# Patient Record
Sex: FEMALE | Race: WHITE | NOT HISPANIC OR LATINO | Employment: UNEMPLOYED | URBAN - METROPOLITAN AREA
[De-identification: names, ages, dates, MRNs, and addresses within clinical notes are randomized per-mention and may not be internally consistent; named-entity substitution may affect disease eponyms.]

---

## 2018-04-08 ENCOUNTER — HOSPITAL ENCOUNTER (OUTPATIENT)
Facility: HOSPITAL | Age: 83
Discharge: HOME | End: 2018-04-09
Attending: EMERGENCY MEDICINE | Admitting: PSYCHIATRY & NEUROLOGY
Payer: MEDICARE

## 2018-04-08 DIAGNOSIS — R20.0 LEFT FACIAL NUMBNESS: ICD-10-CM

## 2018-04-08 DIAGNOSIS — G45.9 TRANSIENT CEREBRAL ISCHEMIA, UNSPECIFIED TYPE: Primary | ICD-10-CM

## 2018-04-08 PROBLEM — R53.1 LEFT-SIDED WEAKNESS: Status: ACTIVE | Noted: 2018-04-08

## 2018-04-08 PROBLEM — E78.2 MIXED HYPERLIPIDEMIA: Status: ACTIVE | Noted: 2018-04-08

## 2018-04-08 PROBLEM — I61.5 IVH (INTRAVENTRICULAR HEMORRHAGE): Status: ACTIVE | Noted: 2018-04-08

## 2018-04-08 PROBLEM — E03.9 HYPOTHYROIDISM: Status: ACTIVE | Noted: 2018-04-08

## 2018-04-08 PROBLEM — R29.90 EPISODE OF TRANSIENT NEUROLOGIC SYMPTOMS: Status: ACTIVE | Noted: 2018-04-08

## 2018-04-08 PROBLEM — I10 ESSENTIAL HYPERTENSION: Status: ACTIVE | Noted: 2018-04-08

## 2018-04-08 LAB
ABO + RH BLD: NORMAL
ALBUMIN SERPL BCP-MCNC: 4.1 G/DL
ALP SERPL-CCNC: 66 U/L
ALT SERPL W/O P-5'-P-CCNC: 23 U/L
ANION GAP SERPL CALC-SCNC: 12 MMOL/L
AST SERPL-CCNC: 31 U/L
BASOPHILS # BLD AUTO: 0.04 K/UL
BASOPHILS NFR BLD: 0.4 %
BILIRUB SERPL-MCNC: 0.5 MG/DL
BLD GP AB SCN CELLS X3 SERPL QL: NORMAL
BUN SERPL-MCNC: 19 MG/DL
CALCIUM SERPL-MCNC: 9.8 MG/DL
CHLORIDE SERPL-SCNC: 106 MMOL/L
CHOLEST SERPL-MCNC: 181 MG/DL
CHOLEST/HDLC SERPL: 2.9 {RATIO}
CO2 SERPL-SCNC: 22 MMOL/L
CREAT SERPL-MCNC: 0.9 MG/DL
DIFFERENTIAL METHOD: ABNORMAL
EOSINOPHIL # BLD AUTO: 0.1 K/UL
EOSINOPHIL NFR BLD: 1.3 %
ERYTHROCYTE [DISTWIDTH] IN BLOOD BY AUTOMATED COUNT: 12.9 %
EST. GFR  (AFRICAN AMERICAN): >60 ML/MIN/1.73 M^2
EST. GFR  (NON AFRICAN AMERICAN): 59.3 ML/MIN/1.73 M^2
ESTIMATED AVG GLUCOSE: 114 MG/DL
GLUCOSE SERPL-MCNC: 120 MG/DL
HBA1C MFR BLD HPLC: 5.6 %
HCT VFR BLD AUTO: 40 %
HDLC SERPL-MCNC: 63 MG/DL
HDLC SERPL: 34.8 %
HGB BLD-MCNC: 13.8 G/DL
IMM GRANULOCYTES # BLD AUTO: 0.03 K/UL
IMM GRANULOCYTES NFR BLD AUTO: 0.3 %
INR PPP: 1
LDLC SERPL CALC-MCNC: 94 MG/DL
LYMPHOCYTES # BLD AUTO: 1.5 K/UL
LYMPHOCYTES NFR BLD: 15.5 %
MCH RBC QN AUTO: 30.4 PG
MCHC RBC AUTO-ENTMCNC: 34.5 G/DL
MCV RBC AUTO: 88 FL
MONOCYTES # BLD AUTO: 0.7 K/UL
MONOCYTES NFR BLD: 7.3 %
NEUTROPHILS # BLD AUTO: 7.1 K/UL
NEUTROPHILS NFR BLD: 75.2 %
NONHDLC SERPL-MCNC: 118 MG/DL
NRBC BLD-RTO: 0 /100 WBC
PLATELET # BLD AUTO: 215 K/UL
PMV BLD AUTO: 10.5 FL
POTASSIUM SERPL-SCNC: 4.2 MMOL/L
PROT SERPL-MCNC: 7.2 G/DL
PROTHROMBIN TIME: 10.2 SEC
RBC # BLD AUTO: 4.54 M/UL
SODIUM SERPL-SCNC: 140 MMOL/L
TRIGL SERPL-MCNC: 120 MG/DL
TSH SERPL DL<=0.005 MIU/L-ACNC: 1.55 UIU/ML
WBC # BLD AUTO: 9.48 K/UL

## 2018-04-08 PROCEDURE — 99285 EMERGENCY DEPT VISIT HI MDM: CPT | Mod: 25

## 2018-04-08 PROCEDURE — 99220 PR INITIAL OBSERVATION CARE,LEVL III: CPT | Mod: ,,, | Performed by: PSYCHIATRY & NEUROLOGY

## 2018-04-08 PROCEDURE — A9585 GADOBUTROL INJECTION: HCPCS | Performed by: EMERGENCY MEDICINE

## 2018-04-08 PROCEDURE — G0378 HOSPITAL OBSERVATION PER HR: HCPCS

## 2018-04-08 PROCEDURE — 84443 ASSAY THYROID STIM HORMONE: CPT

## 2018-04-08 PROCEDURE — 85025 COMPLETE CBC W/AUTO DIFF WBC: CPT

## 2018-04-08 PROCEDURE — 25000003 PHARM REV CODE 250: Performed by: PHYSICIAN ASSISTANT

## 2018-04-08 PROCEDURE — A4216 STERILE WATER/SALINE, 10 ML: HCPCS | Performed by: PHYSICIAN ASSISTANT

## 2018-04-08 PROCEDURE — 80053 COMPREHEN METABOLIC PANEL: CPT

## 2018-04-08 PROCEDURE — 85610 PROTHROMBIN TIME: CPT

## 2018-04-08 PROCEDURE — 86901 BLOOD TYPING SEROLOGIC RH(D): CPT

## 2018-04-08 PROCEDURE — 80061 LIPID PANEL: CPT

## 2018-04-08 PROCEDURE — 96372 THER/PROPH/DIAG INJ SC/IM: CPT | Mod: 59

## 2018-04-08 PROCEDURE — 63600175 PHARM REV CODE 636 W HCPCS: Performed by: PHYSICIAN ASSISTANT

## 2018-04-08 PROCEDURE — 83036 HEMOGLOBIN GLYCOSYLATED A1C: CPT

## 2018-04-08 PROCEDURE — 25500020 PHARM REV CODE 255: Performed by: EMERGENCY MEDICINE

## 2018-04-08 PROCEDURE — 99285 EMERGENCY DEPT VISIT HI MDM: CPT | Mod: ,,, | Performed by: EMERGENCY MEDICINE

## 2018-04-08 RX ORDER — AMLODIPINE BESYLATE 5 MG/1
5 TABLET ORAL DAILY
COMMUNITY

## 2018-04-08 RX ORDER — LABETALOL HYDROCHLORIDE 5 MG/ML
10 INJECTION, SOLUTION INTRAVENOUS
Status: DISCONTINUED | OUTPATIENT
Start: 2018-04-08 | End: 2018-04-09 | Stop reason: HOSPADM

## 2018-04-08 RX ORDER — SIMVASTATIN 20 MG/1
20 TABLET, FILM COATED ORAL NIGHTLY
COMMUNITY
End: 2018-04-08

## 2018-04-08 RX ORDER — LEVOTHYROXINE SODIUM 75 UG/1
75 TABLET ORAL DAILY
COMMUNITY

## 2018-04-08 RX ORDER — BRIMONIDINE TARTRATE 2 MG/ML
1 SOLUTION/ DROPS OPHTHALMIC EVERY 8 HOURS
COMMUNITY

## 2018-04-08 RX ORDER — ESTRADIOL 0.1 MG/G
CREAM VAGINAL DAILY
Status: ON HOLD | COMMUNITY
End: 2018-04-09 | Stop reason: HOSPADM

## 2018-04-08 RX ORDER — CLORAZEPATE DIPOTASSIUM 7.5 MG/1
7.5 TABLET ORAL 3 TIMES DAILY
Status: DISCONTINUED | OUTPATIENT
Start: 2018-04-08 | End: 2018-04-09 | Stop reason: HOSPADM

## 2018-04-08 RX ORDER — CALCIUM CARBONATE 600 MG
600 TABLET ORAL ONCE
COMMUNITY

## 2018-04-08 RX ORDER — LEVOTHYROXINE SODIUM 75 UG/1
75 TABLET ORAL DAILY
Status: DISCONTINUED | OUTPATIENT
Start: 2018-04-09 | End: 2018-04-09 | Stop reason: HOSPADM

## 2018-04-08 RX ORDER — CLORAZEPATE DIPOTASSIUM 7.5 MG/1
7.5 TABLET ORAL 3 TIMES DAILY
COMMUNITY

## 2018-04-08 RX ORDER — SUCRALFATE 1 G/1
1 TABLET ORAL 4 TIMES DAILY
COMMUNITY

## 2018-04-08 RX ORDER — SODIUM CHLORIDE 0.9 % (FLUSH) 0.9 %
3 SYRINGE (ML) INJECTION EVERY 8 HOURS
Status: DISCONTINUED | OUTPATIENT
Start: 2018-04-08 | End: 2018-04-09 | Stop reason: HOSPADM

## 2018-04-08 RX ORDER — ASPIRIN 81 MG/1
81 TABLET ORAL DAILY
Status: DISCONTINUED | OUTPATIENT
Start: 2018-04-09 | End: 2018-04-09 | Stop reason: HOSPADM

## 2018-04-08 RX ORDER — OMEPRAZOLE 40 MG/1
40 CAPSULE, DELAYED RELEASE ORAL DAILY
COMMUNITY

## 2018-04-08 RX ORDER — ATORVASTATIN CALCIUM 20 MG/1
40 TABLET, FILM COATED ORAL DAILY
Status: DISCONTINUED | OUTPATIENT
Start: 2018-04-09 | End: 2018-04-09 | Stop reason: HOSPADM

## 2018-04-08 RX ORDER — GADOBUTROL 604.72 MG/ML
10 INJECTION INTRAVENOUS
Status: COMPLETED | OUTPATIENT
Start: 2018-04-08 | End: 2018-04-08

## 2018-04-08 RX ORDER — HEPARIN SODIUM 5000 [USP'U]/ML
5000 INJECTION, SOLUTION INTRAVENOUS; SUBCUTANEOUS EVERY 8 HOURS
Status: DISCONTINUED | OUTPATIENT
Start: 2018-04-08 | End: 2018-04-09 | Stop reason: HOSPADM

## 2018-04-08 RX ADMIN — HEPARIN SODIUM 5000 UNITS: 5000 INJECTION, SOLUTION INTRAVENOUS; SUBCUTANEOUS at 11:04

## 2018-04-08 RX ADMIN — SODIUM CHLORIDE, PRESERVATIVE FREE 3 ML: 5 INJECTION INTRAVENOUS at 10:04

## 2018-04-08 RX ADMIN — GADOBUTROL 10 ML: 604.72 INJECTION INTRAVENOUS at 07:04

## 2018-04-08 NOTE — ED PROVIDER NOTES
"Encounter Date: 4/8/2018    SCRIBE #1 NOTE: I, Elizabeth Houston, am scribing for, and in the presence of, Dr. Spears.       History     Chief Complaint   Patient presents with    Cerebrovascular Accident     transfer from [facility]. On cardene maintaining SBP 130s. All symptoms resolved PTA. No complaints     Time patient was seen by the provider: 3:20 PM      This is a 83 y.o. female with co-morbidities including intestinal cancer, HTN and hypothyroidism who presents to the ED as a transfer from [facility] for further evaluation of and concern for cerebrovascular accident today. Patient states that at approximately 12:15 PM today, the right side of her face started to "feel funny" and it became difficult for her to stand up. She additionally endorses speech difficulty, states that she could think of what she wanted to say, but could not get the words out. Per the patient's relative at bedside, upon arrival at [facility], she had left-sided weakness and continued with speech difficulty. Symptoms lasted approximately 20 minutes, resolved at the current time. Patient denies any past similar episodes. She is not on any blood thinners.       The history is provided by the patient, medical records and a relative.     Review of patient's allergies indicates:   Allergen Reactions    Sulfa (sulfonamide antibiotics)      Past Medical History:   Diagnosis Date    Glaucoma     HTN (hypertension)     Hypothyroidism     Intestinal cancer     small intestine     Past Surgical History:   Procedure Laterality Date    HYSTERECTOMY       No family history on file.  Social History   Substance Use Topics    Smoking status: Not on file    Smokeless tobacco: Not on file    Alcohol use Not on file     Review of Systems   Constitutional: Negative for chills, diaphoresis and fever.   HENT: Negative for congestion.    Eyes: Negative for photophobia and visual disturbance.   Respiratory: Negative for cough and shortness of breath.  " "  Cardiovascular: Negative for chest pain and palpitations.   Gastrointestinal: Negative for abdominal pain, diarrhea, nausea and vomiting.   Musculoskeletal: Negative for arthralgias and myalgias.   Skin: Negative for rash.   Neurological: Positive for speech difficulty and weakness.        Positive for face "feeling funny."    Psychiatric/Behavioral: Negative for behavioral problems.       Physical Exam     Initial Vitals [04/08/18 1457]   BP Pulse Resp Temp SpO2   134/64 102 16 -- 97 %      MAP       87.33         Physical Exam    Nursing note and vitals reviewed.  Constitutional: She appears well-developed and well-nourished. She is not diaphoretic. No distress.   HENT:   Head: Normocephalic and atraumatic.   Mouth/Throat: Oropharynx is clear and moist.   Neck: Normal range of motion. Neck supple. No JVD present.   Cardiovascular: Normal rate, regular rhythm, normal heart sounds and intact distal pulses.   Pulmonary/Chest: Breath sounds normal. No respiratory distress. She has no wheezes. She has no rhonchi. She has no rales.   Abdominal: Soft. She exhibits no distension. There is no tenderness.   Musculoskeletal: Normal range of motion. She exhibits no edema.   Lymphadenopathy:     She has no cervical adenopathy.   Neurological: She is alert and oriented to person, place, and time. She has normal strength. No cranial nerve deficit or sensory deficit.   Skin: Skin is warm and dry.         ED Course   Procedures  Labs Reviewed   COMPREHENSIVE METABOLIC PANEL - Abnormal; Notable for the following:        Result Value    CO2 22 (*)     Glucose 120 (*)     eGFR if non  59.3 (*)     All other components within normal limits   CBC W/ AUTO DIFFERENTIAL - Abnormal; Notable for the following:     Gran% 75.2 (*)     Lymph% 15.5 (*)     All other components within normal limits   PROTIME-INR   TYPE & SCREEN             Medical Decision Making:   History:   Old Medical Records: I decided to obtain old " medical records.  Clinical Tests:   Lab Tests: Ordered and Reviewed  Radiological Study: Ordered and Reviewed  ED Management:  3:19 PM Before I evaluated the patient, case discussed with Vascular Neurology who is recommending Neuro Critical Care and possible Neurosurgery consult.     3:41 PM Case discussed with Neuro ICU. They will evaluate the patient.     4:17 PM Repeat CT head shows colloid cyst. Does not shows intracranial hemorrhage. I do not think this was the cause of patient's symptoms. She likely had a TIA. Neurosurgical consult and Neuro Critical Care consult cancelled after discussion with Vascular Neurology. They will admit for further treatment and evaluation.   Other:   I have discussed this case with another health care provider.       <> Summary of the Discussion: Vascular (Stroke) Neurology            Scribe Attestation:   Scribe #1: I performed the above scribed service and the documentation accurately describes the services I performed. I attest to the accuracy of the note.    Attending Attestation:           Physician Attestation for Scribe:      Comments: I, Dr. Dannielle Spears, personally performed the services described in this documentation. All medical record entries made by the scribe were at my direction and in my presence.  I have reviewed the chart and agree that the record reflects my personal performance and is accurate and complete. Dannielle Spears MD.                 Clinical Impression:   The primary encounter diagnosis was Transient cerebral ischemia, unspecified type. A diagnosis of Left facial numbness was also pertinent to this visit.    Disposition:   Disposition: Admitted                        Dannielle Spears MD  04/08/18 1926

## 2018-04-08 NOTE — ED NOTES
"Pt states "I was having trouble speaking early this afternoon." Pt's son reports slurred speech and left sided weakness this afternoon. He reports all symptoms resolved during CT scan at [facility].   "

## 2018-04-09 ENCOUNTER — TELEPHONE (OUTPATIENT)
Dept: NEUROLOGY | Facility: CLINIC | Age: 83
End: 2018-04-09

## 2018-04-09 VITALS
BODY MASS INDEX: 27.2 KG/M2 | HEART RATE: 101 BPM | HEIGHT: 59 IN | SYSTOLIC BLOOD PRESSURE: 145 MMHG | TEMPERATURE: 99 F | WEIGHT: 134.94 LBS | OXYGEN SATURATION: 92 % | DIASTOLIC BLOOD PRESSURE: 68 MMHG | RESPIRATION RATE: 17 BRPM

## 2018-04-09 PROBLEM — N30.00 ACUTE CYSTITIS: Status: ACTIVE | Noted: 2018-04-09

## 2018-04-09 LAB
ALBUMIN SERPL BCP-MCNC: 3.9 G/DL
ALP SERPL-CCNC: 59 U/L
ALT SERPL W/O P-5'-P-CCNC: 23 U/L
ANION GAP SERPL CALC-SCNC: 12 MMOL/L
APTT BLDCRRT: 22.5 SEC
AST SERPL-CCNC: 31 U/L
BACTERIA #/AREA URNS AUTO: ABNORMAL /HPF
BASOPHILS # BLD AUTO: 0.05 K/UL
BASOPHILS NFR BLD: 0.6 %
BILIRUB SERPL-MCNC: 0.6 MG/DL
BILIRUB UR QL STRIP: NEGATIVE
BUN SERPL-MCNC: 15 MG/DL
CALCIUM SERPL-MCNC: 9.5 MG/DL
CHLORIDE SERPL-SCNC: 107 MMOL/L
CK MB SERPL-MCNC: 2.2 NG/ML
CK MB SERPL-RTO: 1.1 %
CK SERPL-CCNC: 208 U/L
CLARITY UR REFRACT.AUTO: ABNORMAL
CO2 SERPL-SCNC: 23 MMOL/L
COLOR UR AUTO: ABNORMAL
CREAT SERPL-MCNC: 0.8 MG/DL
DIASTOLIC DYSFUNCTION: NO
DIFFERENTIAL METHOD: NORMAL
EOSINOPHIL # BLD AUTO: 0.2 K/UL
EOSINOPHIL NFR BLD: 2.8 %
ERYTHROCYTE [DISTWIDTH] IN BLOOD BY AUTOMATED COUNT: 13.1 %
EST. GFR  (AFRICAN AMERICAN): >60 ML/MIN/1.73 M^2
EST. GFR  (NON AFRICAN AMERICAN): >60 ML/MIN/1.73 M^2
ESTIMATED PA SYSTOLIC PRESSURE: 13.37
GLUCOSE SERPL-MCNC: 110 MG/DL
GLUCOSE UR QL STRIP: NEGATIVE
HCT VFR BLD AUTO: 41.6 %
HGB BLD-MCNC: 13.5 G/DL
HGB UR QL STRIP: ABNORMAL
IMM GRANULOCYTES # BLD AUTO: 0.02 K/UL
IMM GRANULOCYTES NFR BLD AUTO: 0.2 %
INR PPP: 0.9
KETONES UR QL STRIP: NEGATIVE
LEUKOCYTE ESTERASE UR QL STRIP: ABNORMAL
LYMPHOCYTES # BLD AUTO: 1.6 K/UL
LYMPHOCYTES NFR BLD: 18.5 %
MAGNESIUM SERPL-MCNC: 2.2 MG/DL
MCH RBC QN AUTO: 29.6 PG
MCHC RBC AUTO-ENTMCNC: 32.5 G/DL
MCV RBC AUTO: 91 FL
MICROSCOPIC COMMENT: ABNORMAL
MONOCYTES # BLD AUTO: 0.8 K/UL
MONOCYTES NFR BLD: 9.2 %
NEUTROPHILS # BLD AUTO: 5.9 K/UL
NEUTROPHILS NFR BLD: 68.7 %
NITRITE UR QL STRIP: NEGATIVE
NRBC BLD-RTO: 0 /100 WBC
PH UR STRIP: 7 [PH] (ref 5–8)
PHOSPHATE SERPL-MCNC: 3.6 MG/DL
PLATELET # BLD AUTO: 229 K/UL
PMV BLD AUTO: 10.2 FL
POTASSIUM SERPL-SCNC: 3.9 MMOL/L
PROT SERPL-MCNC: 6.8 G/DL
PROT UR QL STRIP: NEGATIVE
PROTHROMBIN TIME: 9.9 SEC
RBC # BLD AUTO: 4.56 M/UL
RBC #/AREA URNS AUTO: 2 /HPF (ref 0–4)
RETIRED EF AND QEF - SEE NOTES: 60 (ref 55–65)
SODIUM SERPL-SCNC: 142 MMOL/L
SP GR UR STRIP: 1.01 (ref 1–1.03)
SQUAMOUS #/AREA URNS AUTO: 3 /HPF
TRICUSPID VALVE REGURGITATION: NORMAL
TROPONIN I SERPL DL<=0.01 NG/ML-MCNC: <0.006 NG/ML
URN SPEC COLLECT METH UR: ABNORMAL
UROBILINOGEN UR STRIP-ACNC: NEGATIVE EU/DL
WBC # BLD AUTO: 8.52 K/UL
WBC #/AREA URNS AUTO: >100 /HPF (ref 0–5)
WBC CLUMPS UR QL AUTO: ABNORMAL

## 2018-04-09 PROCEDURE — 82550 ASSAY OF CK (CPK): CPT

## 2018-04-09 PROCEDURE — G8997 SWALLOW GOAL STATUS: HCPCS | Mod: CH

## 2018-04-09 PROCEDURE — 25000003 PHARM REV CODE 250: Performed by: PHYSICIAN ASSISTANT

## 2018-04-09 PROCEDURE — 85025 COMPLETE CBC W/AUTO DIFF WBC: CPT

## 2018-04-09 PROCEDURE — G8988 SELF CARE GOAL STATUS: HCPCS | Mod: CH

## 2018-04-09 PROCEDURE — 85730 THROMBOPLASTIN TIME PARTIAL: CPT

## 2018-04-09 PROCEDURE — A4216 STERILE WATER/SALINE, 10 ML: HCPCS | Performed by: PHYSICIAN ASSISTANT

## 2018-04-09 PROCEDURE — 84484 ASSAY OF TROPONIN QUANT: CPT

## 2018-04-09 PROCEDURE — 84100 ASSAY OF PHOSPHORUS: CPT

## 2018-04-09 PROCEDURE — 87086 URINE CULTURE/COLONY COUNT: CPT

## 2018-04-09 PROCEDURE — 97161 PT EVAL LOW COMPLEX 20 MIN: CPT

## 2018-04-09 PROCEDURE — 87088 URINE BACTERIA CULTURE: CPT

## 2018-04-09 PROCEDURE — G0378 HOSPITAL OBSERVATION PER HR: HCPCS

## 2018-04-09 PROCEDURE — 93306 TTE W/DOPPLER COMPLETE: CPT

## 2018-04-09 PROCEDURE — G8996 SWALLOW CURRENT STATUS: HCPCS | Mod: CH

## 2018-04-09 PROCEDURE — 92523 SPEECH SOUND LANG COMPREHEN: CPT

## 2018-04-09 PROCEDURE — 81001 URINALYSIS AUTO W/SCOPE: CPT

## 2018-04-09 PROCEDURE — 87186 SC STD MICRODIL/AGAR DIL: CPT

## 2018-04-09 PROCEDURE — G8979 MOBILITY GOAL STATUS: HCPCS | Mod: CH

## 2018-04-09 PROCEDURE — 82553 CREATINE MB FRACTION: CPT

## 2018-04-09 PROCEDURE — 83735 ASSAY OF MAGNESIUM: CPT

## 2018-04-09 PROCEDURE — 87077 CULTURE AEROBIC IDENTIFY: CPT

## 2018-04-09 PROCEDURE — G8980 MOBILITY D/C STATUS: HCPCS | Mod: CH

## 2018-04-09 PROCEDURE — 97165 OT EVAL LOW COMPLEX 30 MIN: CPT

## 2018-04-09 PROCEDURE — 80053 COMPREHEN METABOLIC PANEL: CPT

## 2018-04-09 PROCEDURE — G8987 SELF CARE CURRENT STATUS: HCPCS | Mod: CH

## 2018-04-09 PROCEDURE — 93306 TTE W/DOPPLER COMPLETE: CPT | Mod: 26,,, | Performed by: INTERNAL MEDICINE

## 2018-04-09 PROCEDURE — 99217 PR OBSERVATION CARE DISCHARGE: CPT | Mod: ,,, | Performed by: PSYCHIATRY & NEUROLOGY

## 2018-04-09 PROCEDURE — 92610 EVALUATE SWALLOWING FUNCTION: CPT

## 2018-04-09 PROCEDURE — 85610 PROTHROMBIN TIME: CPT

## 2018-04-09 PROCEDURE — G8989 SELF CARE D/C STATUS: HCPCS | Mod: CH

## 2018-04-09 PROCEDURE — G8978 MOBILITY CURRENT STATUS: HCPCS | Mod: CH

## 2018-04-09 RX ORDER — ATORVASTATIN CALCIUM 40 MG/1
40 TABLET, FILM COATED ORAL DAILY
Qty: 30 TABLET | Refills: 1
Start: 2018-04-10 | End: 2018-04-09

## 2018-04-09 RX ORDER — ASPIRIN 81 MG/1
81 TABLET ORAL DAILY
Qty: 30 TABLET | Refills: 1
Start: 2018-04-10 | End: 2018-04-09

## 2018-04-09 RX ORDER — AMOXICILLIN AND CLAVULANATE POTASSIUM 500; 125 MG/1; MG/1
1 TABLET, FILM COATED ORAL 2 TIMES DAILY
Qty: 14 TABLET | Refills: 0 | Status: SHIPPED | OUTPATIENT
Start: 2018-04-09 | End: 2018-04-11 | Stop reason: ALTCHOICE

## 2018-04-09 RX ORDER — ASPIRIN 81 MG/1
81 TABLET ORAL DAILY
Qty: 30 TABLET | Refills: 1 | Status: SHIPPED | OUTPATIENT
Start: 2018-04-10 | End: 2019-08-20

## 2018-04-09 RX ORDER — AMOXICILLIN AND CLAVULANATE POTASSIUM 500; 125 MG/1; MG/1
1 TABLET, FILM COATED ORAL 2 TIMES DAILY
Qty: 14 TABLET | Refills: 0
Start: 2018-04-09 | End: 2018-04-09

## 2018-04-09 RX ORDER — ATORVASTATIN CALCIUM 40 MG/1
40 TABLET, FILM COATED ORAL DAILY
Qty: 30 TABLET | Refills: 1 | Status: SHIPPED | OUTPATIENT
Start: 2018-04-10 | End: 2019-08-20

## 2018-04-09 RX ORDER — AMOXICILLIN AND CLAVULANATE POTASSIUM 500; 125 MG/1; MG/1
1 TABLET, FILM COATED ORAL 2 TIMES DAILY
Status: DISCONTINUED | OUTPATIENT
Start: 2018-04-09 | End: 2018-04-09 | Stop reason: HOSPADM

## 2018-04-09 RX ADMIN — ASPIRIN 81 MG: 81 TABLET, COATED ORAL at 08:04

## 2018-04-09 RX ADMIN — AMOXICILLIN AND CLAVULANATE POTASSIUM 500 MG: 500; 125 TABLET, FILM COATED ORAL at 10:04

## 2018-04-09 RX ADMIN — LEVOTHYROXINE SODIUM 75 MCG: 75 TABLET ORAL at 08:04

## 2018-04-09 RX ADMIN — ATORVASTATIN CALCIUM 40 MG: 20 TABLET, FILM COATED ORAL at 08:04

## 2018-04-09 RX ADMIN — SODIUM CHLORIDE, PRESERVATIVE FREE 3 ML: 5 INJECTION INTRAVENOUS at 10:04

## 2018-04-09 RX ADMIN — CLORAZEPATE DIPOTASSIUM 7.5 MG: 7.5 TABLET ORAL at 08:04

## 2018-04-09 NOTE — HOSPITAL COURSE
4/9/18 - DC home today, no needs. Will treat for UTI. Culture pending    Patient with symptoms concerning for TIA.   Noted to have no stroke on MRI, therapy teams recommending home with no needs.   Normal [place] on echo     Stroke work up completed   Will need to be evaluated for cyst found on CT/MRI outpatient     Dc - simvastatin and estrogen cream (reported wasn't using it as it was too expensive)   Started- ASA, atorvastatin and augmentin for UTI (culture pending)    Discharge plan and stroke education discussed with patient and family

## 2018-04-09 NOTE — PT/OT/SLP EVAL
"Occupational Therapy   Evaluation and Discharge Note    Name: Adrienne Santos  MRN: [id number]  Admitting Diagnosis:  Episode of transient neurologic symptoms      Recommendations:     Discharge Recommendations: home  Discharge Equipment Recommendations:     Barriers to discharge:  None    History:     Occupational Profile:  Patient resides in [place] alone in one story home with one step to enter.  PTA patient independent with ADLs including driving.  Currently owns no DME.  Patient is right handed.  Works:  Part time, .  Hobbies:  Spending time with family, shopping.  Roles/Responsibilities:  , aunt, mother, sister, grandmother.      Past Medical History:   Diagnosis Date    Glaucoma     HTN (hypertension)     Hypothyroidism     Intestinal cancer     small intestine       Past Surgical History:   Procedure Laterality Date    HYSTERECTOMY         Subjective     Patient:  "I remember you when you worked with my sister."  "I couldn't control my chin; I couldn't talk.  My left leg then got numb.  I feel fine now."   Communicated with: nurse prior to session.  Pain/Comfort:  · Pain Rating 1: 0/10  · Pain Rating Post-Intervention 1: 0/10    Patients cultural, spiritual, Anabaptist conflicts given the current situation: Gnosticism    Objective:     Patient found with: peripheral IV, telemetry  Dtg and son in law present.  General Precautions: Standard, aspiration, fall   Orthopedic Precautions:N/A   Braces: N/A     Occupational Performance:    Bed Mobility:    · Patient completed Rolling/Turning to Left with  independence  · Patient completed Rolling/Turning to Right with independence  · Patient completed Scooting/Bridging with independence  · Patient completed Supine to Sit with independence  · Patient completed Sit to Supine with independence    Functional Mobility/Transfers:  · Patient completed Sit <> Stand Transfer with modified independence  with  no assistive device   · Patient completed Bed <> Chair " Transfer using Stand Pivot technique with modified independence with no assistive device    Activities of Daily Living:  · Feeding:  independence    · Grooming: independence    · UB Dressing: independence    · LB Dressing: modified independence    · Toileting: independence      Cognitive/Visual Perceptual:  Cognitive/Psychosocial Skills:     -       Oriented to: Person, Place, Time and Situation   -       Follows Commands/attention:Follows two-step commands  -       Communication: clear/fluent  -       Memory: No Deficits noted  -       Safety awareness/insight to disability: intact   -       Mood/Affect/Coping skills/emotional control: Appropriate to situation  Visual/Perceptual:      -Intact    Physical Exam:  Postural examination/scapula alignment:    -       Rounded shoulders  Skin integrity: Visible skin intact  Edema:  None noted  Sensation:    -       Intact  Upper Extremity Range of Motion:     -       Right Upper Extremity: WNL  -       Left Upper Extremity: WNL  Upper Extremity Strength:    -       Right Upper Extremity: WNL  -       Left Upper Extremity: WNL    Patient left supine with all lines intact and call button in reach    AMPA 6 Click:  AMPA Total Score: 24    Treatment & Education:  Patient/ Family education provided for stroke warning signs, prevention guidelines and personal risk factors.  Patient/ Family verbalizing understanding via teach back method.   Patient education provided on role of OT and need for no further OT upon discharge. Patient's functional status and disposition recommendation discussed with stroke team in daily rounds.  White board updated in patient's room.  OT asked if there were any other questions; patient/ family had no further questions.       Education:    Assessment:     Adrienne Santos is a 83 y.o. female with a medical diagnosis of Episode of transient neurologic symptoms. At this time, patient is functioning at their prior level of function and does not require  "further acute OT services.     Clinical Decision Makin.  OT Low:  "Pt evaluation falls under low complexity for evaluation coding due to performance deficits noted in 1-3 areas as stated above and 0 co-morbities affecting current functional status. Data obtained from problem focused assessments. No modifications or assistance was required for completion of evaluation. Only brief occupational profile and history review completed."     Plan:     During this hospitalization, patient does not require further acute OT services.  Please re-consult if situation changes.    · Plan of Care Reviewed with: patient, family    This Plan of care has been discussed with the patient who was involved in its development and understands and is in agreement with the identified goals and treatment plan    GOALS:    Occupational Therapy Goals     Not on file          Multidisciplinary Problems (Resolved)        Problem: Occupational Therapy Goal    Goal Priority Disciplines Outcome Interventions   Occupational Therapy Goal   (Resolved)     OT, PT/OT Outcome(s) achieved    Description:  Goals not set 2* no further OT needed.  ASHLEY Parish  2018                      Time Tracking:     OT Date of Treatment: 18  OT Start Time: 659  OT Stop Time: 717  OT Total Time (min): 18 min    Billable Minutes:Evaluation 18    ASHLEY Parish  2018    "

## 2018-04-09 NOTE — SUBJECTIVE & OBJECTIVE
Neurologic Chief Complaint: TIA     Subjective:     Interval History: Patient is seen for follow-up neurological assessment and treatment recommendations: symptoms resolved. Discussed plan with family   Started augmentin for uti, culture pending     HPI, Past Medical, Family, and Social History remains the same as documented in the initial encounter.     Review of Systems   Constitutional: Negative for fever.   Skin: Negative for pallor.   Neurological: Negative for speech difficulty and weakness.     Scheduled Meds:  Continuous Infusions:  PRN Meds:    Objective:     Vital Signs (Most Recent):  Temp: 98.6 °F (37 °C) (04/09/18 1214)  Pulse: 101 (04/09/18 1214)  Resp: 17 (04/09/18 1214)  BP: (!) 145/68 (04/09/18 1214)  SpO2: (!) 92 % (04/09/18 1214)  BP Location: Left arm    Vital Signs Range (Last 24H):  Temp:  [97.2 °F (36.2 °C)-98.7 °F (37.1 °C)]   Pulse:  []   Resp:  [12-20]   BP: (115-145)/(56-78)   SpO2:  [92 %-99 %]   BP Location: Left arm    Physical Exam   Constitutional: She is oriented to person, place, and time. She appears well-developed and well-nourished.   HENT:   Head: Normocephalic and atraumatic.   Cardiovascular: Normal rate.    Pulmonary/Chest: Effort normal.   Neurological: She is alert and oriented to person, place, and time.   Nursing note and vitals reviewed.      Neurological Exam:   LOC: alert  Attention Span: Good   Language: No aphasia  Articulation: No dysarthria  EOM (CN III, IV, VI): Full/intact  Facial Movement (CN VII): Symmetric facial expression    Motor: Arm left  Normal 5/5  Leg left  Normal 5/5  Arm right  Normal 5/5  Leg right Normal 5/5  Sensation: Intact to light touch, temperature and vibration  Tone: Normal tone throughout    Laboratory:  CMP:   Recent Labs  Lab 04/09/18  0405   CALCIUM 9.5   ALBUMIN 3.9   PROT 6.8      K 3.9   CO2 23      BUN 15   CREATININE 0.8   ALKPHOS 59   ALT 23   AST 31   BILITOT 0.6     CBC:   Recent Labs  Lab 04/09/18  0405   WBC  8.52   RBC 4.56   HGB 13.5   HCT 41.6      MCV 91   MCH 29.6   MCHC 32.5     Lipid Panel:   Recent Labs  Lab 04/08/18  2149   CHOL 181   LDLCALC 94.0   HDL 63   TRIG 120     Coagulation:   Recent Labs  Lab 04/09/18  0405   INR 0.9   APTT 22.5     Platelet Aggregation Study: No results for input(s): PLTAGG, PLTAGINTERP, PLTAGREGLACO, ADPPLTAGGREG in the last 168 hours.  Hgb A1C:   Recent Labs  Lab 04/08/18 2149   HGBA1C 5.6     TSH:   Recent Labs  Lab 04/08/18 2149   TSH 1.553       Diagnostic Results   CT head 4/8/18  No evidence of acute hemorrhage or major vascular distribution infarct.    Mild generalized cerebral volume loss and chronic microvascular ischemic change.    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  No evidence of associated hydrocephalus at this time.    MRI /MRA 4/8/18  No evidence of acute intracranial pathology.  Specifically, no evidence of acute infarction or intracranial hemorrhage.    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  No evidence of hydrocephalus.    Age-appropriate generalized cerebral volume loss with moderate chronic microvascular ischemic change.    MR angiogram of the head and neck appears within normal limits without evidence of high-grade stenosis, focal occlusion, or aneurysm.    Echo 4/9/18  LA normal   CONCLUSIONS     1 - Normal left ventricular systolic function (EF 60-65%).     2 - Normal left ventricular diastolic function.     3 - Normal right ventricular systolic function .

## 2018-04-09 NOTE — CONSULTS
Inpatient consult to Physical Medicine Rehab  Consult performed by: ALIX KERR  Consult ordered by: BRAEDEN LARA  Reason for consult: assess rehab needs        Patient is high level with therapy. She is (I) with bed mobility, sit to stand, transfers, feeding, grooming, UBD/LDB and ambulated 150 ft (I). She has normal cognitive skills. Will sign off.  Please call with questions/concerns or re-consult if situation changes.      SLADE King, FNP-C  Physical Medicine & Rehabilitation   04/09/2018  Spectralink: [id number]

## 2018-04-09 NOTE — ED NOTES
Patient resting on stretcher. RR spontaneous, even, and unlabored. Bed locked in low position, with side rails up x2 for safety. Call bell within reach. Denies needing toileting. Denies pain. Patient aware of POC, and has no complaints at this time. Will continue to monitor.

## 2018-04-09 NOTE — ED NOTES
Report received from Flaco Barr. Pt AAOx4 and without deficits at this time. Will continue to monitor pt. No current infusions at this time. VSS.

## 2018-04-09 NOTE — PT/OT/SLP EVAL
Physical Therapy Evaluation   Discharge Note    Patient Name:  Adrienne Santos   MRN:  [id number]    Recommendations:     Discharge Recommendations:  home   Discharge Equipment Recommendations: none   Barriers to discharge: None    Assessment:     Adrienne Santos is a 83 y.o. female admitted with a medical diagnosis of Episode of transient neurologic symptoms. She was independent prior to admit and lived alone.  At this time, patient is functioning at their prior level of function and does not require further acute PT services. She is safe to d/c home with no additional skilled PT follow up when medically appropriate.     Recent Surgery: * No surgery found *      Plan:     During this hospitalization, patient does not require further acute PT services.  Please re-consult if situation changes.     Plan of Care Reviewed with: patient, family      History:     Past Medical History:   Diagnosis Date    Glaucoma     HTN (hypertension)     Hypothyroidism     Intestinal cancer     small intestine       Past Surgical History:   Procedure Laterality Date    HYSTERECTOMY         Subjective     Communicated with Rn prior to session.  Patient found sitting EOB with family present upon PT entry to room, agreeable to evaluation.      Chief Complaint: none stated  Patient comments/goals: I would like to go home.   Pain/Comfort:  · Pain Rating 1: 0/10  · Pain Rating Post-Intervention 1: 0/10    Patients cultural, spiritual, Nondenominational conflicts given the current situation:      Living Environment:  Pt lives alone in [place], [place] in a 1 story home with 1 step to enter.  She was completely independent and driving prior to admit.     Objective:     Patient found with: telemetry     General Precautions: Standard, aspiration, fall   She was sitting EOB, talking, and in NAD.  Pt agreed to evaluation and stood up without difficulty.     PHYSICAL EXAMINATION  Cognitive Function:  - Oriented to: person, place, time and situation   - Level  of Alertness: awake, alert, appropriate  - Follows Commands/attention: Follows multistep  commands  - Communication: clear/fluent  - Safety awareness/insight to disability: intact  Musculoskeletal System  Upper Extremities:   ROM: WFL  Strength: WFL  Lower Extremities:  ROM: WFL  Strength:  Muscle Group R LE L LE Comments   Hip flexion  5/5 5/5       Knee flexion  5/5 5/5       Knee ext. 5/5 5/5    Ankle DF 5/5 5/5    Ankle PF 5/5 5/5    Neuromuscular System:  - Sensation: grossly intact   - Coordination:    Finger to thumb opposition: intact   Posture and gross symmetry: rounded shoulders  Vision:  NT    BALANCE:  Sitting:  - Level of assistance: independent    Standing:  - Level of assistance: independent    FUNCTIONAL MOBILITY ASSESSMENT:  Bed Mobility: NT  Transfers:  - Sit <> stand transfer: independent   - Bed <> chair transfer: independent  - Toilet transfer: NT    Gait:   Gait x 150 feet with independence, no gait deviations, no LOB, and no safety concerns  - Patient demonstrated good gait speed, reciprocal gait pattern and no LOB or instability with dynamic gait tasks    Dynamic Gait Index (DGI):  1. Gait level surface: (3) Normal: Walks 20ft, no assistive devices, good speed, no evidence for imbalance, normal gait pattern.  2. Change in gait speed: (3) Normal: Able to smoothly change walking speed without loss of balance or gait deviation. Shows asignificant difference in walking speeds between normal, fast and slow speeds..  3. Gait with horizontal head turns: (3) Normal: Performs head turns smoothly with no change in gait..  4. Gait with vertical head turns: (3) Normal: Performs head turns smoothly with no change in gait..  5. Gait and pivot turn: (3) Normal: Pivot turns safely within 3 seconds and stops quickly with no loss of balance..  6. Step over obstacle: (3) Normal: Is able to step over the box without changing gait speed, no evidence of imbalance..  7. Step around obstacles:  (3) Normal: Is able  to walk around cones safely without changing gait speed; no evidence ofimbalance..  8. Steps: (3) Normal: Alternating feet, no rail..    TOTAL SCORE: 24 / 24   < 19/24 = predictive of falls in the elderly   > 22/24 = safe ambulators    THERAPEUTIC ACTIVITIES AND EXERCISES:  Therapist educated patient and fmaily on the role of PT, POC, and therapy recommendations of no PT needs.  Stroke education was provided via teach back method.  Therapist discussed the patients current mobility status and level of assistance with patient and RN.  Therapist answered questions to patient/familys satisfaction within scope of practice.  White board updated to reflect current level of assistance.      Patient left sitting up in chair with all lines intact and RN and family  present.       Orthopedic Precautions:    Braces:     AM-PAC 6 CLICK MOBILITY  Total Score:24   GOALS:    Physical Therapy Goals     Not on file          Multidisciplinary Problems (Resolved)        Problem: Physical Therapy Goal    Goal Priority Disciplines Outcome Goal Variances Interventions   Physical Therapy Goal   (Resolved)     PT/OT, PT Outcome(s) achieved                   Clinical Decision Making:   COMPLEXITY OF PT EXAMINATION:  HISTORY  - Comorbidities that affect the PT plan of care or the patient's ability to participate in/progress with therapy:  1. UTI  - Personal Factors:   1. Time since onset of injury / illness / exacerbation.  EXAMINATION  - Body Systems:  1. Communication ability, affect, cognition, language, and learning style: the assessment of the ability to make needs known, consciousness, orientation, expected emotional /behavioral responses, and learning preferences  2. Neuromuscular system: a general assessment of gross coordinated movement (eg, balance, gait, locomotion, transfers, and transitions) and motor function (motor control and motor learning)  3. Musculoskeletal system: the assessment of gross symmetry, gross range of motion,  gross strength, height, and weight  - Activity or participation limitations:   CLINICAL PRESENTATION: Stable and/or uncomplicated  LEVEL OF COMPLEXITY: Low Complexity - no personal factors or comorbidities that impact the plan of care; examination addressing 1-2 body structures and functions, activity limitations, and/or participation restrictions; and clinical presentation with stable or uncomplicated characteristics.    Time Tracking:     PT Received On: 04/09/18  PT Start Time: 1000     PT Stop Time: 1009  PT Total Time (min): 9 min     Billable Minutes: Evaluation 9      Tracy Hernandez, PT  04/09/2018

## 2018-04-09 NOTE — H&P
[facility]  Vascular Neurology  Comprehensive Stroke Center  History & Physical    Inpatient consult to Vascular (Stroke) Neurology  Consult performed by: BRAEDEN LARA  Consult ordered by: ZEV FELIPE        Assessment/Plan:     Patient is a 83 y.o. year old female with:    * Episode of transient neurologic symptoms    82 yo woman with PMHx HTN, hypothyroidism, HLD transferred from [place] after presenting with sudden reported right facial droop, slurred speech, and reported L-hemiplegia. Telestroke with Dr. Barrera, no tPA as outside CTH concerning for small ICH in third ventricle, possible underlying mass. Following CT scan, however, pt's symptoms completely resolved. She was transferred to [facility] for further evaluation/higher level of care.    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 As pt now back to baseline, admitted to Vascular Neurology for TIA workup.    Antithrombotics for secondary stroke prevention: Antiplatelets: Aspirin: 81 mg daily  Statins for secondary stroke prevention and hyperlipidemia, if present:   Statins: Atorvastatin- 40 mg daily  Aggressive risk factor modification: HTN, HLD, Diet, Exercise  Rehab efforts: PT/OT/SLP to evaluate and treat, PM&R consult   Diagnostics ordered/pending: HgbA1C to assess blood glucose levels, Lipid Profile to assess cholesterol levels, TTE to assess cardiac function/status , TSH to assess thyroid function  VTE prophylaxis: Heparin 5000 units SQ every 8 hours  BP parameters: TIA: < 160        Left-sided weakness    Initial presentation, now resolved  PT/OT eval & treat        Mixed hyperlipidemia    Stroke risk factor  Lipid panel pending  Reported hx but no home med listed  Atorvastatin 40mg        Essential hypertension    Stroke risk factor  No stroke on imaging, SBP < 160  Home meds held as pt < 140 without        Hypothyroidism    Stroke risk factor  TSH pending  Continue home synthroid             STROKE DOCUMENTATION     Acute Stroke Times   Last Known Normal Date: 04/08/18  Last Known Normal Time: 1230  Symptom Onset Date: 04/08/18  Symptom Onset Time: 1230  Stroke Team Called Date: 04/08/18  Stroke Team Called Time: 1518  Stroke Team Arrival Date: 04/08/18  Stroke Team Arrival Time: 1520  CT Interpretation Time: 1542  Decision to Treat Time for Alteplase: 1305 (No tPA, concern for ICH)  Decision to Treat Time for IR: 1525 (Pt with no neuro deficits)    NIH Scale:  1a. Level Of Consciousness: 0-->Alert: keenly responsive  1b. LOC Questions: 0-->Answers both questions correctly  1c. LOC Commands: 0-->Performs both tasks correctly  2. Best Gaze: 0-->Normal  3. Visual: 0-->No visual loss  4. Facial Palsy: 0-->Normal symmetrical movements  5a. Motor Arm, Left: 0-->No drift: limb holds 90 (or 45) degrees for full 10 secs  5b. Motor Arm, Right: 0-->No drift: limb holds 90 (or 45) degrees for full 10 secs  6a. Motor Leg, Left: 0-->No drift: leg holds 30 degree position for full 5 secs  6b. Motor Leg, Right: 0-->No drift: leg holds 30 degree position for full 5 secs  7. Limb Ataxia: 0-->Absent  8. Sensory: 0-->Normal: no sensory loss  9. Best Language: 0-->No aphasia: normal  10. Dysarthria: 0-->Normal  11. Extinction and Inattention (formerly Neglect): 0-->No abnormality  Total (NIH Stroke Scale): 0     Modified [place] Score: 0  Kristopher Coma Scale:    ABCD2 Score:    [id number] Score:   HAS -BLED Score:   ICH Score:   Hunt & Zavala Classification:      Thrombolysis Candidate? No, CT findings (ICH, SAH, etc)       Interventional Revascularization Candidate?   Is the patient eligible for mechanical endovascular reperfusion (MILTON)?  No; No significant neurological deficit    Hemorrhagic change of an Ischemic Stroke: Does this patient have an ischemic stroke with hemorrhagic changes? No         Subjective:     History of Present Illness:  Ms. Santos is a 82 yo woman with PMHx HTN, hypothyroidism, HLD who was  transferred from [place] after presenting there with sudden reported right facial droop, slurred speech, and reported L-hemiplegia. She states she was visiting a family member at NH today when around ~1230, she suddenly felt dizzy and began with numbness around her mouth, speech difficulty and L-sided weakness. Presented to [place] where telestroke was performed with Dr. Barrera. tPA not given due to outside CTH concerning for small ICH in third ventricle, possible underlying mass. However, following CT scan, pt's symptoms completely resolved. She was transferred to [facility] for further evaluation/higher level of care.  Upon arrival, Ms. Santos is awake, alert, following commands with no neurologic deficits. She lives alone and performs all of her ADLs. Denies hx smoking, drinking, drugs. Denies use of antiplatelets or anticoagulation.        Past Medical History:   Diagnosis Date    Glaucoma     HTN (hypertension)     Hypothyroidism     Intestinal cancer     small intestine     Past Surgical History:   Procedure Laterality Date    HYSTERECTOMY       No family history on file.  Social History   Substance Use Topics    Smoking status: Not on file    Smokeless tobacco: Not on file    Alcohol use Not on file     Review of patient's allergies indicates:   Allergen Reactions    Sulfa (sulfonamide antibiotics)        Medications: I have reviewed the current medication administration record.      (Not in a hospital admission)    Review of Systems   Constitutional: Negative for appetite change and fever.   HENT: Negative for ear discharge and sneezing.    Eyes: Negative for discharge and visual disturbance.   Respiratory: Negative for choking and stridor.    Cardiovascular: Negative for chest pain and leg swelling.   Gastrointestinal: Negative for diarrhea and vomiting.   Musculoskeletal: Negative for gait problem and neck stiffness.   Skin: Negative for rash and wound.   Neurological: Positive for facial  asymmetry, speech difficulty and weakness.   Psychiatric/Behavioral: Positive for confusion. Negative for agitation.     Objective:     Vital Signs (Most Recent):  Pulse: 82 (04/08/18 2042)  Resp: 15 (04/08/18 2042)  BP: (!) 120/59 (04/08/18 2042)  SpO2: 95 % (04/08/18 2042)    Vital Signs Range (Last 24H):  Pulse:  []   Resp:  [14-20]   BP: (115-140)/(55-65)   SpO2:  [93 %-100 %]     Physical Exam   Constitutional: She is oriented to person, place, and time. She appears well-developed and well-nourished. No distress.   HENT:   Head: Normocephalic and atraumatic.   Eyes: Conjunctivae and EOM are normal.   Cardiovascular: Normal rate.    Pulmonary/Chest: Effort normal. No respiratory distress.   Musculoskeletal: Normal range of motion. She exhibits no edema.   Neurological: She is alert and oriented to person, place, and time. No cranial nerve deficit or sensory deficit.   Skin: Skin is warm and dry.   Psychiatric: She has a normal mood and affect. Her behavior is normal.       Neurological Exam:   LOC: alert  Attention Span: Good   Language: No aphasia  Articulation: No dysarthria  Orientation: Person, Place, Time   Visual Fields: Full  EOM (CN III, IV, VI): Full/intact  Facial Sensation (CN V): Normal  Facial Movement (CN VII): Symmetric facial expression    Motor: 5/5 throughout  Cebellar: No evidence of appendicular or axial ataxia  Sensation: Intact to light touch, temperature  Tone: Normal tone throughout      Laboratory:  CMP:   Recent Labs  Lab 04/08/18  1531   CALCIUM 9.8   ALBUMIN 4.1   PROT 7.2      K 4.2   CO2 22*      BUN 19   CREATININE 0.9   ALKPHOS 66   ALT 23   AST 31   BILITOT 0.5     CBC:   Recent Labs  Lab 04/08/18  1531   WBC 9.48   RBC 4.54   HGB 13.8   HCT 40.0      MCV 88   MCH 30.4   MCHC 34.5     Lipid Panel: No results for input(s): CHOL, LDLCALC, HDL, TRIG in the last 168 hours.  Coagulation:   Recent Labs  Lab 04/08/18  1531   INR 1.0     Hgb A1C: No results for  input(s): HGBA1C in the last 168 hours.  TSH: No results for input(s): TSH in the last 168 hours.    Diagnostic Results:      Brain/Vessel imaging:    MRI/MRA Brain/Neck W WO Contrast 4/8/18  No evidence of acute intracranial pathology. Specifically, no evidence of acute infarction or intracranial hemorrhage.  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 No evidence of hydrocephalus.  Age-appropriate generalized cerebral volume loss with moderate chronic microvascular ischemic change.  MR angiogram of the head and neck appears within normal limits without evidence of high-grade stenosis, focal occlusion, or aneurysm.    CT Head 4/8/18  No evidence of acute hemorrhage or major vascular distribution infarct.  Mild generalized cerebral volume loss and chronic microvascular ischemic change.  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 No evidence of associated hydrocephalus at this time.      Cardiac Evaluation:     Echo pending        Monica Stratton PA-C  Comprehensive Stroke Center  Department of Vascular Neurology   [facility]

## 2018-04-09 NOTE — SUBJECTIVE & OBJECTIVE
Past Medical History:   Diagnosis Date    Glaucoma     HTN (hypertension)     Hypothyroidism     Intestinal cancer     small intestine     Past Surgical History:   Procedure Laterality Date    HYSTERECTOMY       No family history on file.  Social History   Substance Use Topics    Smoking status: Not on file    Smokeless tobacco: Not on file    Alcohol use Not on file     Review of patient's allergies indicates:   Allergen Reactions    Sulfa (sulfonamide antibiotics)        Medications: I have reviewed the current medication administration record.      (Not in a hospital admission)    Review of Systems   Constitutional: Negative for appetite change and fever.   HENT: Negative for ear discharge and sneezing.    Eyes: Negative for discharge and visual disturbance.   Respiratory: Negative for choking and stridor.    Cardiovascular: Negative for chest pain and leg swelling.   Gastrointestinal: Negative for diarrhea and vomiting.   Musculoskeletal: Negative for gait problem and neck stiffness.   Skin: Negative for rash and wound.   Neurological: Positive for facial asymmetry, speech difficulty and weakness.   Psychiatric/Behavioral: Positive for confusion. Negative for agitation.     Objective:     Vital Signs (Most Recent):  Pulse: 82 (04/08/18 2042)  Resp: 15 (04/08/18 2042)  BP: (!) 120/59 (04/08/18 2042)  SpO2: 95 % (04/08/18 2042)    Vital Signs Range (Last 24H):  Pulse:  []   Resp:  [14-20]   BP: (115-140)/(55-65)   SpO2:  [93 %-100 %]     Physical Exam   Constitutional: She is oriented to person, place, and time. She appears well-developed and well-nourished. No distress.   HENT:   Head: Normocephalic and atraumatic.   Eyes: Conjunctivae and EOM are normal.   Cardiovascular: Normal rate.    Pulmonary/Chest: Effort normal. No respiratory distress.   Musculoskeletal: Normal range of motion. She exhibits no edema.   Neurological: She is alert and oriented to person, place, and time. No cranial nerve  deficit or sensory deficit.   Skin: Skin is warm and dry.   Psychiatric: She has a normal mood and affect. Her behavior is normal.       Neurological Exam:   LOC: alert  Attention Span: Good   Language: No aphasia  Articulation: No dysarthria  Orientation: Person, Place, Time   Visual Fields: Full  EOM (CN III, IV, VI): Full/intact  Facial Sensation (CN V): Normal  Facial Movement (CN VII): Symmetric facial expression    Motor: 5/5 throughout  Cebellar: No evidence of appendicular or axial ataxia  Sensation: Intact to light touch, temperature  Tone: Normal tone throughout      Laboratory:  CMP:   Recent Labs  Lab 04/08/18  1531   CALCIUM 9.8   ALBUMIN 4.1   PROT 7.2      K 4.2   CO2 22*      BUN 19   CREATININE 0.9   ALKPHOS 66   ALT 23   AST 31   BILITOT 0.5     CBC:   Recent Labs  Lab 04/08/18  1531   WBC 9.48   RBC 4.54   HGB 13.8   HCT 40.0      MCV 88   MCH 30.4   MCHC 34.5     Lipid Panel: No results for input(s): CHOL, LDLCALC, HDL, TRIG in the last 168 hours.  Coagulation:   Recent Labs  Lab 04/08/18  1531   INR 1.0     Hgb A1C: No results for input(s): HGBA1C in the last 168 hours.  TSH: No results for input(s): TSH in the last 168 hours.    Diagnostic Results:      Brain/Vessel imaging:    MRI/MRA Brain/Neck W WO Contrast 4/8/18  No evidence of acute intracranial pathology. Specifically, no evidence of acute infarction or intracranial hemorrhage.  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 No evidence of hydrocephalus.  Age-appropriate generalized cerebral volume loss with moderate chronic microvascular ischemic change.  MR angiogram of the head and neck appears within normal limits without evidence of high-grade stenosis, focal occlusion, or aneurysm.    CT Head 4/8/18  No evidence of acute hemorrhage or major vascular distribution infarct.  Mild generalized cerebral volume loss and chronic microvascular ischemic change.  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 No evidence of  associated hydrocephalus at this time.      Cardiac Evaluation:     Echo pending

## 2018-04-09 NOTE — HPI
Ms. Santos is a 82 yo woman with PMHx HTN, hypothyroidism, HLD who was transferred from [place] after presenting there with sudden reported right facial droop, slurred speech, and reported L-hemiplegia. She states she was visiting a family member at NH today when around ~1230, she suddenly felt dizzy and began with numbness around her mouth, speech difficulty and L-sided weakness. Presented to [place] where telestroke was performed with Dr. Barrera. tPA not given due to outside CTH concerning for small ICH in third ventricle, possible underlying mass. However, following CT scan, pt's symptoms completely resolved. She was transferred to [facility] for further evaluation/higher level of care.  Upon arrival, Ms. Santos is awake, alert, following commands with no neurologic deficits. She lives alone and performs all of her ADLs. Denies hx smoking, drinking, drugs. Denies use of antiplatelets or anticoagulation.

## 2018-04-09 NOTE — ED NOTES
Vascular neurology contacted to get an update on POC. MD from vascular neurology to come down in the morning and update family.

## 2018-04-09 NOTE — TELEPHONE ENCOUNTER
----- Message from Nat Rivera RN sent at 4/9/2018 11:38 AM CDT -----  Contact: Nat Hurt  Please schedule hospital follow up in 4-6 weeks.  saw the patient while in the hospital. Please call the patient with date and time of appt.

## 2018-04-09 NOTE — ASSESSMENT & PLAN NOTE
82 yo woman with PMHx HTN, hypothyroidism, HLD transferred from [place] after presenting with sudden reported right facial droop, slurred speech, and reported L-hemiplegia. Telestroke with Dr. Barrera, no tPA as outside CTH concerning for small ICH in third ventricle, possible underlying mass. Following CT scan, however, pt's symptoms completely resolved. She was transferred to [facility] for further evaluation/higher level of care.     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 As pt now back to baseline, admitted to Vascular Neurology for TIA workup.    Antithrombotics for secondary stroke prevention: Antiplatelets: Aspirin: 81 mg daily  Statins for secondary stroke prevention and hyperlipidemia, if present:   Statins: Atorvastatin- 40 mg daily, discontinue simvastatin   Aggressive risk factor modification: HTN, HLD, Diet, Exercise  Rehab efforts: PT/OT/SLP to evaluate and treat, PM&R consult   Diagnostics ordered/pending: none  VTE prophylaxis: Heparin 5000 units SQ every 8 hours  BP parameters: TIA: < 160

## 2018-04-09 NOTE — DISCHARGE SUMMARY
[facility]  Vascular Neurology  Comprehensive Stroke Center  Discharge Summary     Summary:     Admit Date: 4/8/2018  2:58 PM    Discharge Date and Time: 4/9/2018  1:45 PM    Attending Physician: Dr Nasra Carter    Discharge Provider: STEVEN Avila    History of Present Illness: Ms. Santos is a 84 yo woman with PMHx HTN, hypothyroidism, HLD who was transferred from [place] after presenting there with sudden reported right facial droop, slurred speech, and reported L-hemiplegia. She states she was visiting a family member at NH today when around ~1230, she suddenly felt dizzy and began with numbness around her mouth, speech difficulty and L-sided weakness. Presented to [place] where telestroke was performed with Dr. Barrera. tPA not given due to outside CTH concerning for small ICH in third ventricle, possible underlying mass. However, following CT scan, pt's symptoms completely resolved. She was transferred to [facility] for further evaluation/higher level of care.  Upon arrival, Ms. Santos is awake, alert, following commands with no neurologic deficits. She lives alone and performs all of her ADLs. Denies hx smoking, drinking, drugs. Denies use of antiplatelets or anticoagulation.    Hospital Course (synopsis of major diagnoses, care, treatment, and services provided during the course of the hospital stay): 4/9/18 - DC home today, no needs. Will treat for UTI. Culture pending    Patient with symptoms concerning for TIA.   Noted to have no stroke on MRI, therapy teams recommending home with no needs.   Normal LA on echo     Stroke work up completed   Will need to be evaluated for cyst found on CT/MRI outpatient     Dc - simvastatin and estrogen cream (reported wasn't using it as it was too expensive)   Started- ASA, atorvastatin and augmentin for UTI (culture pending)    Discharge plan and stroke education discussed with patient and family     STROKE DOCUMENTATION   Acute Stroke Times    Last Known Normal Date: 04/08/18  Last Known Normal Time: 1230  Symptom Onset Date: 04/08/18  Symptom Onset Time: 1230  Stroke Team Called Date: 04/08/18  Stroke Team Called Time: 1518  Stroke Team Arrival Date: 04/08/18  Stroke Team Arrival Time: 1520  CT Interpretation Time: 1542  Decision to Treat Time for Alteplase: 1305 (No tPA, concern for ICH)  Decision to Treat Time for IR: 1525 (Pt with no neuro deficits)     NIH Scale:  1a. Level Of Consciousness: 0-->Alert: keenly responsive  1b. LOC Questions: 0-->Answers both questions correctly  1c. LOC Commands: 0-->Performs both tasks correctly  2. Best Gaze: 0-->Normal  3. Visual: 0-->No visual loss  4. Facial Palsy: 0-->Normal symmetrical movements  5a. Motor Arm, Left: 0-->No drift: limb holds 90 (or 45) degrees for full 10 secs  5b. Motor Arm, Right: 0-->No drift: limb holds 90 (or 45) degrees for full 10 secs  6a. Motor Leg, Left: 0-->No drift: leg holds 30 degree position for full 5 secs  6b. Motor Leg, Right: 0-->No drift: leg holds 30 degree position for full 5 secs  7. Limb Ataxia: 0-->Absent  8. Sensory: 0-->Normal: no sensory loss  9. Best Language: 0-->No aphasia: normal  10. Dysarthria: 0-->Normal  11. Extinction and Inattention (formerly Neglect): 0-->No abnormality  Total (NIH Stroke Scale): 0        Modified [place] Score: 1  [place] Coma Scale:15   ABCD2 Score:    [id number] Score:   HAS -BLED Score:   ICH Score:   Hunt & Zavala Classification:       Assessment/Plan:     Diagnostic Results:    CT head 4/8/18  No evidence of acute hemorrhage or major vascular distribution infarct.    Mild generalized cerebral volume loss and chronic microvascular ischemic change.    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  No evidence of associated hydrocephalus at this time.     MRI /MRA 4/8/18  No evidence of acute intracranial pathology.  Specifically, no evidence of acute infarction or intracranial hemorrhage.    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  No  evidence of hydrocephalus.    Age-appropriate generalized cerebral volume loss with moderate chronic microvascular ischemic change.    MR angiogram of the head and neck appears within normal limits without evidence of high-grade stenosis, focal occlusion, or aneurysm.     Echo 4/9/18  LA normal   CONCLUSIONS     1 - Normal left ventricular systolic function (EF 60-65%).     2 - Normal left ventricular diastolic function.     3 - Normal right ventricular systolic function .        Interventions: None    Complications: None    Disposition: Home or Self Care    Final Active Diagnoses:    Diagnosis Date Noted POA    PRINCIPAL PROBLEM:  Episode of transient neurologic symptoms [R29.90] 04/08/2018 Yes    Acute cystitis [N30.00] 04/09/2018 Unknown    Left-sided weakness [R53.1] 04/08/2018 Unknown    Essential hypertension [I10] 04/08/2018 Unknown    Mixed hyperlipidemia [E78.2] 04/08/2018 Unknown    Hypothyroidism [E03.9] 04/08/2018 Unknown      Problems Resolved During this Admission:    Diagnosis Date Noted Date Resolved POA     * Episode of transient neurologic symptoms    84 yo woman with PMHx HTN, hypothyroidism, HLD transferred from [place] after presenting with sudden reported right facial droop, slurred speech, and reported L-hemiplegia. Telestroke with Dr. Barrera, no tPA as outside CTH concerning for small ICH in third ventricle, possible underlying mass. Following CT scan, however, pt's symptoms completely resolved. She was transferred to [facility] for further evaluation/higher level of care.     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 As pt now back to baseline, admitted to Vascular Neurology for TIA workup.    Antithrombotics for secondary stroke prevention: Antiplatelets: Aspirin: 81 mg daily  Statins for secondary stroke prevention and hyperlipidemia, if present:   Statins: Atorvastatin- 40 mg daily, discontinue simvastatin   Aggressive risk factor modification: HTN, HLD, Diet,  Exercise  Rehab efforts: PT/OT/SLP to evaluate and treat, PM&R consult   Diagnostics ordered/pending: none  VTE prophylaxis: Heparin 5000 units SQ every 8 hours  BP parameters: TIA: < 160        Acute cystitis    Started augmetin   Will follow culture         Hypothyroidism    Stroke risk factor  TSH pending  Continue home synthroid        Mixed hyperlipidemia    Stroke risk factor  Lipid panel pending  Atorvastatin 40mg        Essential hypertension    Stroke risk factor  No stroke on imaging, SBP < 160  Home meds held as pt < 140 without        Left-sided weakness    Initial presentation, now resolved  PT/OT eval & treat            Recommendations:     Post-discharge complication risks: Falls    Stroke Education given to: patient and family    Follow-up in Stroke Clinic in  4-6 weeks  PCP in one week       Discharge Plan:  Antithrombotics: Aspirin 81mg  Statin: Atorvastatin 40mg  Aggresive risk factor modification:  Hypertension  High Cholesterol    Follow Up:  Follow-up Information     Moises Palacios MD On 4/23/2018.    Specialty:  Family Medicine  Why:  appt at 10:00 am.  Contact information:  [address] [place] [address] [phone]             [facility].    Specialty:  Neurology  Why:  The office will call you to schedule an appt in 4-6 weeks. If the office does not call you by 4/16/18 please call to schedule an appt.  Contact information:  [address]  [facility] [address] [phone]  Additional information:  7th Floor                 Patient Instructions:     Activity as tolerated         Medications:  Reconciled Home Medications:      Medication List      START taking these medications    amoxicillin-clavulanate 500-125mg 500-125 mg Tab  Commonly known as:  AUGMENTIN  Take 1 tablet (500 mg total) by mouth 2 (two) times daily.     aspirin 81 MG EC tablet  Commonly known as:  ECOTRIN  Take 1 tablet (81 mg total) by mouth once daily.  Start taking on:   4/10/2018     atorvastatin 40 MG tablet  Commonly known as:  LIPITOR  Take 1 tablet (40 mg total) by mouth once daily.  Start taking on:  4/10/2018        CONTINUE taking these medications    amLODIPine 5 MG tablet  Commonly known as:  NORVASC     brimonidine 0.2% 0.2 % Drop  Commonly known as:  ALPHAGAN     calcium carbonate 600 mg calcium (1,500 mg) Tab  Commonly known as:  OS-XIOMY     clorazepate 7.5 MG Tab  Commonly known as:  TRANXENE     levothyroxine 75 MCG tablet  Commonly known as:  SYNTHROID     LIQUID B 12 ORAL     omeprazole 40 MG capsule  Commonly known as:  PRILOSEC     sucralfate 1 gram tablet  Commonly known as:  CARAFATE        STOP taking these medications    estradiol 0.01 % (0.1 mg/gram) vaginal cream  Commonly known as:  ESTRACE     simvastatin 20 MG tablet  Commonly known as:  ZOCOR           Where to Get Your Medications      These medications were sent to [facility] [place] [address] [address], [place] [address]    Phone:  [phone]   · amoxicillin-clavulanate 500-125mg 500-125 mg Tab  · aspirin 81 MG EC tablet  · atorvastatin 40 MG tablet         STEVEN Avila  Comprehensive Stroke Center  Department of Vascular Neurology   [facility]

## 2018-04-09 NOTE — PROGRESS NOTES
[facility]  Vascular Neurology  Comprehensive Stroke Center  Progress Note    Assessment/Plan:     * Episode of transient neurologic symptoms    82 yo woman with PMHx HTN, hypothyroidism, HLD transferred from [place] after presenting with sudden reported right facial droop, slurred speech, and reported L-hemiplegia. Telestroke with Dr. Barrera, no tPA as outside CTH concerning for small ICH in third ventricle, possible underlying mass. Following CT scan, however, pt's symptoms completely resolved. She was transferred to [facility] for further evaluation/higher level of care.     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 As pt now back to baseline, admitted to Vascular Neurology for TIA workup.    Antithrombotics for secondary stroke prevention: Antiplatelets: Aspirin: 81 mg daily  Statins for secondary stroke prevention and hyperlipidemia, if present:   Statins: Atorvastatin- 40 mg daily, discontinue simvastatin   Aggressive risk factor modification: HTN, HLD, Diet, Exercise  Rehab efforts: PT/OT/SLP to evaluate and treat, PM&R consult   Diagnostics ordered/pending: HgbA1C to assess blood glucose levels, Lipid Profile to assess cholesterol levels, TTE to assess cardiac function/status , TSH to assess thyroid function  VTE prophylaxis: Heparin 5000 units SQ every 8 hours  BP parameters: TIA: < 160        Acute cystitis    Started augmetin   Will follow culture         Hypothyroidism    Stroke risk factor  TSH pending  Continue home synthroid        Mixed hyperlipidemia    Stroke risk factor  Lipid panel pending  Reported hx but no home med listed  Atorvastatin 40mg        Essential hypertension    Stroke risk factor  No stroke on imaging, SBP < 160  Home meds held as pt < 140 without        Left-sided weakness    Initial presentation, now resolved  PT/OT eval & treat             4/9/18 - DC home today, no needs. Will treat for UTI. Culture pending    STROKE DOCUMENTATION   Acute  Stroke Times   Last Known Normal Date: 04/08/18  Last Known Normal Time: 1230  Symptom Onset Date: 04/08/18  Symptom Onset Time: 1230  Stroke Team Called Date: 04/08/18  Stroke Team Called Time: 1518  Stroke Team Arrival Date: 04/08/18  Stroke Team Arrival Time: 1520  CT Interpretation Time: 1542  Decision to Treat Time for Alteplase: 1305 (No tPA, concern for ICH)  Decision to Treat Time for IR: 1525 (Pt with no neuro deficits)    NIH Scale:  1a. Level Of Consciousness: 0-->Alert: keenly responsive  1b. LOC Questions: 0-->Answers both questions correctly  1c. LOC Commands: 0-->Performs both tasks correctly  2. Best Gaze: 0-->Normal  3. Visual: 0-->No visual loss  4. Facial Palsy: 0-->Normal symmetrical movements  5a. Motor Arm, Left: 0-->No drift: limb holds 90 (or 45) degrees for full 10 secs  5b. Motor Arm, Right: 0-->No drift: limb holds 90 (or 45) degrees for full 10 secs  6a. Motor Leg, Left: 0-->No drift: leg holds 30 degree position for full 5 secs  6b. Motor Leg, Right: 0-->No drift: leg holds 30 degree position for full 5 secs  7. Limb Ataxia: 0-->Absent  8. Sensory: 0-->Normal: no sensory loss  9. Best Language: 0-->No aphasia: normal  10. Dysarthria: 0-->Normal  11. Extinction and Inattention (formerly Neglect): 0-->No abnormality  Total (NIH Stroke Scale): 0       Modified [place] Score: 1  [place] Coma Scale:15   ABCD2 Score:    [id number] Score:   HAS -BLED Score:   ICH Score:   Hunt & Zavala Classification:      Hemorrhagic change of an Ischemic Stroke: Does this patient have an ischemic stroke with hemorrhagic changes? No     Neurologic Chief Complaint: TIA     Subjective:     Interval History: Patient is seen for follow-up neurological assessment and treatment recommendations: symptoms resolved. Discussed plan with family   Started augmentin for uti, culture pending     HPI, Past Medical, Family, and Social History remains the same as documented in the initial encounter.     Review of Systems    Constitutional: Negative for fever.   Skin: Negative for pallor.   Neurological: Negative for speech difficulty and weakness.     Scheduled Meds:  Continuous Infusions:  PRN Meds:    Objective:     Vital Signs (Most Recent):  Temp: 98.6 °F (37 °C) (04/09/18 1214)  Pulse: 101 (04/09/18 1214)  Resp: 17 (04/09/18 1214)  BP: (!) 145/68 (04/09/18 1214)  SpO2: (!) 92 % (04/09/18 1214)  BP Location: Left arm    Vital Signs Range (Last 24H):  Temp:  [97.2 °F (36.2 °C)-98.7 °F (37.1 °C)]   Pulse:  []   Resp:  [12-20]   BP: (115-145)/(56-78)   SpO2:  [92 %-99 %]   BP Location: Left arm    Physical Exam   Constitutional: She is oriented to person, place, and time. She appears well-developed and well-nourished.   HENT:   Head: Normocephalic and atraumatic.   Cardiovascular: Normal rate.    Pulmonary/Chest: Effort normal.   Neurological: She is alert and oriented to person, place, and time.   Nursing note and vitals reviewed.      Neurological Exam:   LOC: alert  Attention Span: Good   Language: No aphasia  Articulation: No dysarthria  EOM (CN III, IV, VI): Full/intact  Facial Movement (CN VII): Symmetric facial expression    Motor: Arm left  Normal 5/5  Leg left  Normal 5/5  Arm right  Normal 5/5  Leg right Normal 5/5  Sensation: Intact to light touch, temperature and vibration  Tone: Normal tone throughout    Laboratory:  CMP:   Recent Labs  Lab 04/09/18  0405   CALCIUM 9.5   ALBUMIN 3.9   PROT 6.8      K 3.9   CO2 23      BUN 15   CREATININE 0.8   ALKPHOS 59   ALT 23   AST 31   BILITOT 0.6     CBC:   Recent Labs  Lab 04/09/18  0405   WBC 8.52   RBC 4.56   HGB 13.5   HCT 41.6      MCV 91   MCH 29.6   MCHC 32.5     Lipid Panel:   Recent Labs  Lab 04/08/18  2149   CHOL 181   LDLCALC 94.0   HDL 63   TRIG 120     Coagulation:   Recent Labs  Lab 04/09/18  0405   INR 0.9   APTT 22.5     Platelet Aggregation Study: No results for input(s): PLTAGG, PLTAGINTERP, PLTAGREGLACO, ADPPLTAGGREG in the last 168  hours.  Hgb A1C:   Recent Labs  Lab 04/08/18 2149   HGBA1C 5.6     TSH:   Recent Labs  Lab 04/08/18 2149   TSH 1.553       Diagnostic Results   CT head 4/8/18  No evidence of acute hemorrhage or major vascular distribution infarct.    Mild generalized cerebral volume loss and chronic microvascular ischemic change.    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  No evidence of associated hydrocephalus at this time.    MRI /MRA 4/8/18  No evidence of acute intracranial pathology.  Specifically, no evidence of acute infarction or intracranial hemorrhage.    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  No evidence of hydrocephalus.    Age-appropriate generalized cerebral volume loss with moderate chronic microvascular ischemic change.    MR angiogram of the head and neck appears within normal limits without evidence of high-grade stenosis, focal occlusion, or aneurysm.    Echo 4/9/18  LA normal   CONCLUSIONS     1 - Normal left ventricular systolic function (EF 60-65%).     2 - Normal left ventricular diastolic function.     3 - Normal right ventricular systolic function .         STEVEN Avila  Comprehensive Stroke Center  Department of Vascular Neurology   [facility]

## 2018-04-09 NOTE — PROGRESS NOTES
Pt d/c home with AVS and personal belongings. PIVs and tele D/C. All questions and concerns addressed. Pt transported to garage via wheelchair.

## 2018-04-09 NOTE — PLAN OF CARE
Problem: Physical Therapy Goal  Goal: Physical Therapy Goal  Outcome: Outcome(s) achieved Date Met: 04/09/18  Initial eval completed.  Results, POC, and therapy recommendations discussed with patient and family.  Complete evaluation documentation to follow.     Pt is independent with mobility and does not have skilled PT needs at this time.  She is safe to d/c home with no PT follow up when medically appropriate.     Tracy Hernandez, PT  4/9/2018  [phone] (pager)

## 2018-04-09 NOTE — NURSING
Received patient from ed, nurse had said she had not seen anyone actually come in to see patient. However orders were put in. When patient got to floor talked to Yolanda and was told that they would have to wait to get MRI results from vascular neurology doctor. Family concerned regarding plan of care.

## 2018-04-09 NOTE — PLAN OF CARE
Problem: Occupational Therapy Goal  Goal: Occupational Therapy Goal  Goals not set 2* no further OT needed.  ASHLEY Parish  4/9/2018    Outcome: Outcome(s) achieved Date Met: 04/09/18  OT evaluation completed.  ASHLEY Parish  4/9/2018

## 2018-04-09 NOTE — ED NOTES
Telebox placed on patient and war room contacted to confirm rhythm appearance on monitors.      [id number]

## 2018-04-09 NOTE — PLAN OF CARE
PCP:Moises Palacios MD    Extended Emergency Contact Information  Primary Emergency Contact: Dae Santos   [facility]  Home Phone: [phone]  Relation: Son  Secondary Emergency Contact: KahlilLuisRachel   [place]  Mobile Phone: [phone]  Relation: Daughter    [facility] - [address]  [address]  [address]  [place] [address]  Phone: [phone] Fax: [phone]    Payor: [facility] / Plan: MEDICARE SUPPLEMENT BCBS OF [place] / Product Type: Medicare Supplement /      Patient was independent living alone prior to hospitalization. PT/OT/SLP are recommending home with no discharge needs. Cm will continue to follow.     04/09/18 1045   Discharge Assessment   Assessment Type Discharge Planning Assessment   Confirmed/corrected address and phone number on facesheet? Yes   Assessment information obtained from? Patient   Expected Length of Stay (days) 1   Communicated expected length of stay with patient/caregiver yes   Prior to hospitilization cognitive status: Alert/Oriented   Prior to hospitalization functional status: Independent   Current cognitive status: Alert/Oriented   Current Functional Status: Independent   Facility Arrived From: Home   Lives With alone   Able to Return to Prior Arrangements yes   Is patient able to care for self after discharge? Yes   Who are your caregiver(s) and their phone number(s)? Heath Jung (son) [phone], Rachel Guillory (daughter) [phone]   Patient's perception of discharge disposition home or selfcare   Readmission Within The Last 30 Days no previous admission in last 30 days   Patient currently being followed by outpatient case management? No   Patient currently receives any other outside agency services? No   Equipment Currently Used at Home none   Do you have any problems affording any of your prescribed medications? No   Is the patient taking medications as prescribed? yes    Does the patient have transportation home? Yes   Transportation Available family or friend will provide;car   Does the patient receive services at the Coumadin Clinic? No   Discharge Plan A Home   Discharge Plan B Home   Patient/Family In Agreement With Plan yes

## 2018-04-09 NOTE — ASSESSMENT & PLAN NOTE
82 yo woman with PMHx HTN, hypothyroidism, HLD transferred from [place] after presenting with sudden reported right facial droop, slurred speech, and reported L-hemiplegia. Telestroke with Dr. Barrera, no tPA as outside CTH concerning for small ICH in third ventricle, possible underlying mass. Following CT scan, however, pt's symptoms completely resolved. She was transferred [place] [facility] [place] evaluation/higher level of care.     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 As pt now back to baseline, admitted to Vascular Neurology for TIA workup.    Antithrombotics for secondary stroke prevention: Antiplatelets: Aspirin: 81 mg daily  Statins for secondary stroke prevention and hyperlipidemia, if present:   Statins: Atorvastatin- 40 mg daily, discontinue simvastatin   Aggressive risk factor modification: HTN, HLD, Diet, Exercise  Rehab efforts: PT/OT/SLP to evaluate and treat, PM&R consult   Diagnostics ordered/pending: HgbA1C to assess blood glucose levels, Lipid Profile to assess cholesterol levels, TTE to assess cardiac function/status , TSH to assess thyroid function  VTE prophylaxis: Heparin 5000 units SQ every 8 hours  BP parameters: TIA: < 160

## 2018-04-09 NOTE — ASSESSMENT & PLAN NOTE
84 yo woman with PMHx HTN, hypothyroidism, HLD transferred from [place] after presenting with sudden reported right facial droop, slurred speech, and reported L-hemiplegia. Telestroke with Dr. Barrera, no tPA as outside CTH concerning for small ICH in third ventricle, possible underlying mass. Following CT scan, however, pt's symptoms completely resolved. She was transferred to [facility] for further evaluation/higher level of care.    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 As pt now back to baseline, admitted to Vascular Neurology for TIA workup.    Antithrombotics for secondary stroke prevention: Antiplatelets: Aspirin: 81 mg daily  Statins for secondary stroke prevention and hyperlipidemia, if present:   Statins: Atorvastatin- 40 mg daily  Aggressive risk factor modification: HTN, HLD, Diet, Exercise  Rehab efforts: PT/OT/SLP to evaluate and treat, PM&R consult   Diagnostics ordered/pending: HgbA1C to assess blood glucose levels, Lipid Profile to assess cholesterol levels, TTE to assess cardiac function/status , TSH to assess thyroid function  VTE prophylaxis: Heparin 5000 units SQ every 8 hours  BP parameters: TIA: < 160

## 2018-04-09 NOTE — PT/OT/SLP EVAL
"Speech Language Pathology Evaluation  Cognitive/Bedside Swallow    Patient Name:  Adrienne Santos   MRN:  [id number]  Admitting Diagnosis: Episode of transient neurologic symptoms    Recommendations:                  General Recommendations:  home  Diet recommendations:  Regular, Thin   Aspiration Precautions: Standard aspiration precautions   General Precautions: Standard, aspiration, fall  Communication strategies:  none    History:     Past Medical History:   Diagnosis Date    Glaucoma     HTN (hypertension)     Hypothyroidism     Intestinal cancer     small intestine       Past Surgical History:   Procedure Laterality Date    HYSTERECTOMY         Social History: Patient lives with self.        Prior diet: regular  Occupation/hobbies/homemaking: independent    Subjective     "I am doing fine... Back to normal"  Patient goals: go home     Pain/Comfort:  · Pain Rating 1: 0/10  · Pain Rating Post-Intervention 1: 0/10    Objective:     Cognitive Status:    Pt. was oriented x3 with good recall of recent and remote temporal and general information.  Immediate/delayed verbal recall was wfl with pt. Repeating 7 digits and 5 words without difficulty.    Responses to hypothetical verbal problem solving tasks were accurate and complete.  Pt. Compared and contrasted objects and generated multiple solutions to problems.  Thought organization and categorization skills were wfl with pt. Naming 16 animals given one minute when 15-20 are wnl.  Pt. Responded to functional math and time calculations with 100% accuracy and sequenced 4 words presented auditorily on 2/2 trials.        Receptive Language:   Comprehension:   Pt. Responded to complex yes/no questions and multi step commands with 100% accuracy and no delays in responding.        Pragmatics:    wnl    Expressive Language:  Verbal:    Verbal language skills were wfl with no evidence of aphasia.  Pt. Expressed their thoughts coherently in conversation with no evidence of " confusion or word finding deficits        Motor Speech:  wnl    Voice:   wnl      Reading:   wnl     Written Expression:   wnl    Oral Musculature Evaluation  · Oral Musculature: WNL  · Dentition: present and adequate  · Mucosal Quality: good  · Mandibular Strength and Mobility: WNL  · Oral Labial Strength and Mobility: WNL  · Lingual Strength and Mobility: WNL  · Velar Elevation: WNL  · Buccal Strength and Mobility: WNL  · Volitional Cough: strong  · Volitional Swallow: no delay  · Voice Prior to PO Intake: wfl    Bedside Swallow Eval:   Consistencies Assessed:  · Thin liquids one cup milk  · Puree 3 bites oatmeal  · Solids scrambled egg     Oral Phase:   · WNL    Pharyngeal Phase:   · no overt clinical signs/symptoms of aspiration  · no overt clinical signs/symptoms of pharyngeal dysphagia    Compensatory Strategies  · None    Treatment:     Assessment:     Adrienne Santos is a 83 y.o. female with speech language and cognitive skills were wnl.  Oral and pharyngeal phases of swallow were wnl.    Goals:    SLP Goals        Problem: SLP Goal    Goal Priority Disciplines Outcome   SLP Goal     SLP Ongoing (interventions implemented as appropriate)                   Plan:     · Patient to be seen:      · Plan of Care expires:     · Plan of Care reviewed with:  patient, family   · SLP Follow-Up:  No       Discharge recommendations:  Discharge Facility/Level Of Care Needs: home       Time Tracking:     SLP Treatment Date:   04/09/18  Speech Start Time:  0845  Speech Stop Time:  0901     Speech Total Time (min):  16 min    Billable Minutes: Eval 8  and Eval Swallow and Oral Function 8    Tea Gutiérrez MA, CCC-SLP  04/09/2018

## 2018-04-09 NOTE — PLAN OF CARE
Problem: SLP Goal  Goal: SLP Goal  Outcome: Ongoing (interventions implemented as appropriate)  Regular diet with thin liquids recommended.    Tea Gutiérrez MA/YANIRA-SLP  Speech Language Pathologist  Pager [phone]  4/9/2018

## 2018-04-09 NOTE — CONSULTS
Do not feel it is appropriate to enforce diet restrictions given the patient's age & diet hx. All labs WNL.   If MD adamant for pt to be on restricted diet, please re-consult. All questions and concerns answered.

## 2018-04-09 NOTE — PLAN OF CARE
04/09/18 1300   Final Note   Assessment Type Final Discharge Note   Discharge Disposition Home     Patient is discharged to home today with no discharge needs. Cm made PCP appt with Dr. Palacios on 4/23/18 at 10:00 am. Cm sent in basket message for hospital follow up in 4-6 weeks with [facility]. Patient's family will provide transportation home today.

## 2018-04-10 PROBLEM — N30.00 ACUTE CYSTITIS: Status: ACTIVE | Noted: 2018-04-10

## 2018-04-11 LAB — BACTERIA UR CULT: NORMAL

## 2018-04-11 RX ORDER — NITROFURANTOIN 25; 75 MG/1; MG/1
100 CAPSULE ORAL 2 TIMES DAILY
Qty: 10 CAPSULE | Refills: 0 | Status: SHIPPED | OUTPATIENT
Start: 2018-04-11 | End: 2018-04-16

## 2018-04-11 NOTE — PROGRESS NOTES
12:45 PM  Attempted to call patient 2x with busy signal.  Contacted son - Tom - emergency contact.     Patients UC returned resistant to augmentin- which she was discharged on.   Sensitive to macrobid, which will be E prescribed to her pharmacy.    Advised to stop her prior abx and start the new one this afternoon and take 2 x daily x 5 days.     His questions were answered, he is going to let his mother know and call us back with any questions.       JEANETTE LucasC  [facility]  [phone]

## 2018-05-01 ENCOUNTER — DOCUMENTATION ONLY (OUTPATIENT)
Dept: RESEARCH | Facility: HOSPITAL | Age: 83
End: 2018-05-01

## 2018-05-01 NOTE — PROGRESS NOTES
SUSANA  PI: Gina Buckner  : Odalis Chauhan          Ms Santos was approached on 4/9/2018 by the  to discuss the Stroke Recovery Navigator study. The coordinator explained the purpose of the study and went over the consent in detail with the patient. Patient has the opportunity to ask questions before signing the consent. A copy of the consent was given to the patient, the original is stored with the PI, and a copy was scanned into EPIC.

## 2018-05-17 ENCOUNTER — OFFICE VISIT (OUTPATIENT)
Dept: NEUROLOGY | Facility: CLINIC | Age: 83
End: 2018-05-17
Payer: MEDICARE

## 2018-05-17 VITALS
SYSTOLIC BLOOD PRESSURE: 123 MMHG | DIASTOLIC BLOOD PRESSURE: 74 MMHG | HEART RATE: 81 BPM | WEIGHT: 132.94 LBS | HEIGHT: 59 IN | BODY MASS INDEX: 26.8 KG/M2

## 2018-05-17 DIAGNOSIS — R53.1 LEFT-SIDED WEAKNESS: ICD-10-CM

## 2018-05-17 PROCEDURE — 99214 OFFICE O/P EST MOD 30 MIN: CPT | Mod: S$PBB,,, | Performed by: PSYCHIATRY & NEUROLOGY

## 2018-05-17 PROCEDURE — 99999 PR PBB SHADOW E&M-EST. PATIENT-LVL III: CPT | Mod: PBBFAC,,, | Performed by: PSYCHIATRY & NEUROLOGY

## 2018-05-17 PROCEDURE — 99213 OFFICE O/P EST LOW 20 MIN: CPT | Mod: PBBFAC | Performed by: PSYCHIATRY & NEUROLOGY

## 2018-05-17 NOTE — PROGRESS NOTES
Subjective:       Patient ID: Adrienne Santos is a 83 y.o. female.    Chief Complaint:  Stroke      History of Present Illness  HPI  Stroke Follow-up  She presents for follow-up of a stroke. Event occurred 6 weeks ago. She has residual symptoms of paralysis of left face, left torso, left arm(s), left hand(s), left finger(s), left upper leg(s), left lower leg(s) or left foot(feet), weakness of left face, left arm(s), left hand(s), left finger(s), left upper leg(s), left lower leg(s) or left foot(feet), left facial droop. She denies gait disturbance, balance disturbance, inability to speak, slurred speech, cognitive impairment, swallowing difficulty. Overall she feels the condition is completed. Stroke risk factors include hyperlipidemia and hypertension. She also complains of none. She denies chest pain, palpitations, seizures and shortness of breath.       Review of Systems  Review of Systems   Constitutional: Negative.    HENT: Negative.    Eyes: Negative.    Respiratory: Negative.    Musculoskeletal: Negative.    Neurological: Negative.    All other systems reviewed and are negative.      Objective:      Neurologic Exam     Mental Status   Oriented to person, place, and time.   Registration: recalls 3 of 3 objects. Recall at 5 minutes: recalls 3 of 3 objects. Follows 3 step commands.   Attention: normal. Concentration: normal.   Speech: speech is normal   Level of consciousness: alert  Knowledge: good. Able to perform simple calculations.   Able to name object. Able to read. Able to repeat. Able to write. Normal comprehension.     Cranial Nerves   Cranial nerves II through XII intact.     CN III, IV, VI   Pupils are equal, round, and reactive to light.  Extraocular motions are normal.     Motor Exam   Muscle bulk: normal  Overall muscle tone: normal    Sensory Exam   Light touch normal.   Vibration normal.   Proprioception normal.   Pinprick normal.     Gait, Coordination, and Reflexes     Gait  Gait:  normal    Coordination   Romberg: negative  Finger to nose coordination: normal  Heel to shin coordination: normal  Tandem walking coordination: normal    Tremor   Resting tremor: absent  Intention tremor: absent  Action tremor: absent    Reflexes   Reflexes 2+ except as noted.       Physical Exam   Constitutional: She is oriented to person, place, and time. She appears well-developed and well-nourished.   HENT:   Head: Normocephalic.   Eyes: EOM are normal. Pupils are equal, round, and reactive to light.   Neck: Normal range of motion. Neck supple.   Cardiovascular: Normal rate and regular rhythm.    Pulmonary/Chest: Effort normal.   Neurological: She is alert and oriented to person, place, and time. She has a normal Finger-Nose-Finger Test, a normal Heel to Shin Test, a normal Romberg Test and a normal Tandem Gait Test. Gait normal.   Psychiatric: Her speech is normal.   Vitals reviewed.        Assessment:     Problem List Items Addressed This Visit        Other    Left-sided weakness    Current Assessment & Plan     Recovered strength completely.  Will need follow up in 6 months                 Plan:

## 2019-08-20 ENCOUNTER — OFFICE VISIT (OUTPATIENT)
Dept: NEUROLOGY | Facility: CLINIC | Age: 84
End: 2019-08-20
Payer: MEDICARE

## 2019-08-20 VITALS
DIASTOLIC BLOOD PRESSURE: 85 MMHG | HEART RATE: 92 BPM | SYSTOLIC BLOOD PRESSURE: 156 MMHG | HEIGHT: 59 IN | BODY MASS INDEX: 26.61 KG/M2 | WEIGHT: 132 LBS

## 2019-08-20 DIAGNOSIS — R53.1 LEFT-SIDED WEAKNESS: ICD-10-CM

## 2019-08-20 PROCEDURE — 99999 PR PBB SHADOW E&M-EST. PATIENT-LVL III: ICD-10-PCS | Mod: PBBFAC,,, | Performed by: PSYCHIATRY & NEUROLOGY

## 2019-08-20 PROCEDURE — 99214 OFFICE O/P EST MOD 30 MIN: CPT | Mod: S$PBB,,, | Performed by: PSYCHIATRY & NEUROLOGY

## 2019-08-20 PROCEDURE — 99213 OFFICE O/P EST LOW 20 MIN: CPT | Mod: PBBFAC | Performed by: PSYCHIATRY & NEUROLOGY

## 2019-08-20 PROCEDURE — 99214 PR OFFICE/OUTPT VISIT, EST, LEVL IV, 30-39 MIN: ICD-10-PCS | Mod: S$PBB,,, | Performed by: PSYCHIATRY & NEUROLOGY

## 2019-08-20 PROCEDURE — 99999 PR PBB SHADOW E&M-EST. PATIENT-LVL III: CPT | Mod: PBBFAC,,, | Performed by: PSYCHIATRY & NEUROLOGY

## 2019-08-20 NOTE — PROGRESS NOTES
Subjective:       Patient ID: Adrienne Santos is a 85 y.o. female.    Chief Complaint: Numbness and Fall    Patient fell - tripped over a brick - fell to her knees. No injury. Fell at son's house and fell but without injury.    Review of Systems   Neurological: Positive for weakness.   All other systems reviewed and are negative.      Objective:      Physical Exam   Constitutional: She is oriented to person, place, and time. She appears well-developed and well-nourished.   HENT:   Head: Normocephalic and atraumatic.   Eyes: Pupils are equal, round, and reactive to light. EOM are normal.   Neck: Normal range of motion. Neck supple.   Cardiovascular: Normal rate and regular rhythm.   Pulmonary/Chest: Effort normal and breath sounds normal.   Neurological: She is alert and oriented to person, place, and time.   Vitals reviewed.      Assessment:       1. Left-sided weakness        Plan:       RTC prn.